# Patient Record
Sex: FEMALE | Race: AMERICAN INDIAN OR ALASKA NATIVE
[De-identification: names, ages, dates, MRNs, and addresses within clinical notes are randomized per-mention and may not be internally consistent; named-entity substitution may affect disease eponyms.]

---

## 2020-10-14 ENCOUNTER — HOSPITAL ENCOUNTER (OUTPATIENT)
Dept: HOSPITAL 5 - TRG | Age: 20
Discharge: HOME | End: 2020-10-14
Attending: OBSTETRICS & GYNECOLOGY
Payer: COMMERCIAL

## 2020-10-14 VITALS — SYSTOLIC BLOOD PRESSURE: 127 MMHG | DIASTOLIC BLOOD PRESSURE: 72 MMHG

## 2020-10-14 DIAGNOSIS — Z3A.28: ICD-10-CM

## 2020-10-14 DIAGNOSIS — O47.03: Primary | ICD-10-CM

## 2020-10-14 PROCEDURE — 59025 FETAL NON-STRESS TEST: CPT

## 2020-11-17 ENCOUNTER — HOSPITAL ENCOUNTER (OUTPATIENT)
Dept: HOSPITAL 5 - TRG | Age: 20
Discharge: HOME | End: 2020-11-17
Attending: OBSTETRICS & GYNECOLOGY
Payer: COMMERCIAL

## 2020-11-17 VITALS — DIASTOLIC BLOOD PRESSURE: 57 MMHG | SYSTOLIC BLOOD PRESSURE: 117 MMHG

## 2020-11-17 DIAGNOSIS — O13.3: Primary | ICD-10-CM

## 2020-11-17 DIAGNOSIS — Z3A.33: ICD-10-CM

## 2020-11-17 LAB
BACTERIA #/AREA URNS HPF: (no result) /HPF
BILIRUB UR QL STRIP: (no result)
BLOOD UR QL VISUAL: (no result)
MUCOUS THREADS #/AREA URNS HPF: (no result) /HPF
PH UR STRIP: 6 [PH] (ref 5–7)
PROT UR STRIP-MCNC: (no result) MG/DL
RBC #/AREA URNS HPF: 2 /HPF (ref 0–6)
UROBILINOGEN UR-MCNC: < 2 MG/DL (ref ?–2)
WBC #/AREA URNS HPF: 4 /HPF (ref 0–6)

## 2020-11-17 PROCEDURE — 81001 URINALYSIS AUTO W/SCOPE: CPT

## 2020-11-17 PROCEDURE — 59025 FETAL NON-STRESS TEST: CPT

## 2020-12-11 ENCOUNTER — HOSPITAL ENCOUNTER (INPATIENT)
Dept: HOSPITAL 5 - LD | Age: 20
LOS: 7 days | Discharge: HOME | End: 2020-12-18
Attending: OBSTETRICS & GYNECOLOGY | Admitting: OBSTETRICS & GYNECOLOGY
Payer: COMMERCIAL

## 2020-12-11 DIAGNOSIS — D62: ICD-10-CM

## 2020-12-11 DIAGNOSIS — O14.00: ICD-10-CM

## 2020-12-11 DIAGNOSIS — Z79.899: ICD-10-CM

## 2020-12-11 DIAGNOSIS — Z82.49: ICD-10-CM

## 2020-12-11 DIAGNOSIS — Z3A.37: ICD-10-CM

## 2020-12-11 DIAGNOSIS — Z20.828: ICD-10-CM

## 2020-12-11 DIAGNOSIS — Z83.3: ICD-10-CM

## 2020-12-11 LAB
ALBUMIN SERPL-MCNC: 3 G/DL (ref 3.9–5)
ALT SERPL-CCNC: 6 UNITS/L (ref 7–56)
BUN SERPL-MCNC: 9 MG/DL (ref 7–17)
BUN/CREAT SERPL: 13 %
CALCIUM SERPL-MCNC: 8.9 MG/DL (ref 8.4–10.2)
HCT VFR BLD CALC: 33 % (ref 30.3–42.9)
HCT VFR BLD CALC: 35.1 % (ref 30.3–42.9)
HEMOLYSIS INDEX: 22
HGB BLD-MCNC: 11.3 GM/DL (ref 10.1–14.3)
HGB BLD-MCNC: 11.8 GM/DL (ref 10.1–14.3)
MCHC RBC AUTO-ENTMCNC: 34 % (ref 30–34)
MCHC RBC AUTO-ENTMCNC: 34 % (ref 30–34)
MCV RBC AUTO: 89 FL (ref 79–97)
MCV RBC AUTO: 89 FL (ref 79–97)
PLATELET # BLD: 372 K/MM3 (ref 140–440)
PLATELET # BLD: 393 K/MM3 (ref 140–440)
RBC # BLD AUTO: 3.72 M/MM3 (ref 3.65–5.03)
RBC # BLD AUTO: 3.96 M/MM3 (ref 3.65–5.03)
URATE SERPL-MCNC: 5.4 MG/DL (ref 3.5–7.6)

## 2020-12-11 PROCEDURE — 86901 BLOOD TYPING SEROLOGIC RH(D): CPT

## 2020-12-11 PROCEDURE — 59200 INSERT CERVICAL DILATOR: CPT

## 2020-12-11 PROCEDURE — 83615 LACTATE (LD) (LDH) ENZYME: CPT

## 2020-12-11 PROCEDURE — 86850 RBC ANTIBODY SCREEN: CPT

## 2020-12-11 PROCEDURE — 36415 COLL VENOUS BLD VENIPUNCTURE: CPT

## 2020-12-11 PROCEDURE — 85014 HEMATOCRIT: CPT

## 2020-12-11 PROCEDURE — 76815 OB US LIMITED FETUS(S): CPT

## 2020-12-11 PROCEDURE — 86900 BLOOD TYPING SEROLOGIC ABO: CPT

## 2020-12-11 PROCEDURE — 80053 COMPREHEN METABOLIC PANEL: CPT

## 2020-12-11 PROCEDURE — 85018 HEMOGLOBIN: CPT

## 2020-12-11 PROCEDURE — 84550 ASSAY OF BLOOD/URIC ACID: CPT

## 2020-12-11 PROCEDURE — 86592 SYPHILIS TEST NON-TREP QUAL: CPT

## 2020-12-11 PROCEDURE — 85027 COMPLETE CBC AUTOMATED: CPT

## 2020-12-11 PROCEDURE — U0003 INFECTIOUS AGENT DETECTION BY NUCLEIC ACID (DNA OR RNA); SEVERE ACUTE RESPIRATORY SYNDROME CORONAVIRUS 2 (SARS-COV-2) (CORONAVIRUS DISEASE [COVID-19]), AMPLIFIED PROBE TECHNIQUE, MAKING USE OF HIGH THROUGHPUT TECHNOLOGIES AS DESCRIBED BY CMS-2020-01-R: HCPCS

## 2020-12-11 NOTE — ULTRASOUND REPORT
ULTRASOUND OBSTETRIC LIMITED 



INDICATION / CLINICAL INFORMATION:

presentation.

Clinical Gestational Age (GA): 37.2 weeks.days



COMPARISON:

None available.



FINDINGS:



FETAL HEART RATE (beats per minute): 146 



PRESENTATION: Cephalic. 



ADDITIONAL FINDINGS: None.



IMPRESSION:

1. Single living intrauterine gestation in cephalic position.



Signer Name: Danilo Galvan MD 

Signed: 12/11/2020 5:20 PM

Workstation Name: [id number]
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
## 2020-12-12 PROCEDURE — 3E0P7VZ INTRODUCTION OF HORMONE INTO FEMALE REPRODUCTIVE, VIA NATURAL OR ARTIFICIAL OPENING: ICD-10-PCS | Performed by: OBSTETRICS & GYNECOLOGY

## 2020-12-12 RX ADMIN — SODIUM CHLORIDE, SODIUM LACTATE, POTASSIUM CHLORIDE, AND CALCIUM CHLORIDE SCH MLS/HR: .6; .31; .03; .02 INJECTION, SOLUTION INTRAVENOUS at 07:41

## 2020-12-13 PROCEDURE — 3E0134Z INTRODUCTION OF SERUM, TOXOID AND VACCINE INTO SUBCUTANEOUS TISSUE, PERCUTANEOUS APPROACH: ICD-10-PCS

## 2020-12-13 PROCEDURE — 3E0234Z INTRODUCTION OF SERUM, TOXOID AND VACCINE INTO MUSCLE, PERCUTANEOUS APPROACH: ICD-10-PCS

## 2020-12-13 RX ADMIN — SODIUM CHLORIDE, SODIUM LACTATE, POTASSIUM CHLORIDE, AND CALCIUM CHLORIDE SCH MLS/HR: .6; .31; .03; .02 INJECTION, SOLUTION INTRAVENOUS at 13:49

## 2020-12-13 RX ADMIN — EPHEDRINE SULFATE PRN MG: 50 INJECTION INTRAVENOUS at 15:45

## 2020-12-13 RX ADMIN — EPHEDRINE SULFATE PRN MG: 50 INJECTION INTRAVENOUS at 15:35

## 2020-12-13 RX ADMIN — FENTANYL CITRATE PRN MCG: 50 INJECTION, SOLUTION INTRAMUSCULAR; INTRAVENOUS at 07:36

## 2020-12-13 RX ADMIN — AMPICILLIN SODIUM SCH MLS/HR: 1 INJECTION, POWDER, FOR SOLUTION INTRAMUSCULAR; INTRAVENOUS at 19:30

## 2020-12-13 RX ADMIN — AMPICILLIN SODIUM SCH MLS/HR: 1 INJECTION, POWDER, FOR SOLUTION INTRAMUSCULAR; INTRAVENOUS at 23:30

## 2020-12-13 RX ADMIN — AMPICILLIN SODIUM SCH MLS/HR: 1 INJECTION, POWDER, FOR SOLUTION INTRAMUSCULAR; INTRAVENOUS at 15:49

## 2020-12-13 RX ADMIN — SODIUM CHLORIDE, SODIUM LACTATE, POTASSIUM CHLORIDE, AND CALCIUM CHLORIDE SCH MLS/HR: .6; .31; .03; .02 INJECTION, SOLUTION INTRAVENOUS at 07:44

## 2020-12-13 RX ADMIN — EPHEDRINE SULFATE PRN MG: 50 INJECTION INTRAVENOUS at 15:47

## 2020-12-13 RX ADMIN — SODIUM CHLORIDE, SODIUM LACTATE, POTASSIUM CHLORIDE, AND CALCIUM CHLORIDE SCH MLS/HR: .6; .31; .03; .02 INJECTION, SOLUTION INTRAVENOUS at 16:49

## 2020-12-13 RX ADMIN — SODIUM CHLORIDE, SODIUM LACTATE, POTASSIUM CHLORIDE, AND CALCIUM CHLORIDE SCH MLS/HR: .6; .31; .03; .02 INJECTION, SOLUTION INTRAVENOUS at 21:23

## 2020-12-13 RX ADMIN — FENTANYL CITRATE PRN MCG: 50 INJECTION, SOLUTION INTRAMUSCULAR; INTRAVENOUS at 11:23

## 2020-12-13 RX ADMIN — FENTANYL CITRATE SCH MLS/HR: 50 INJECTION, SOLUTION INTRAMUSCULAR; INTRAVENOUS at 17:16

## 2020-12-13 NOTE — EVENT NOTE
Date: 12/13/20


Variable FHR decelerations noted. Moderate FHR variability and normal FHR 

baseline. Patient consented to use of IUPC for amnioinfusion. IUPC placed and 

amnioinfusion ordered. Cervix is 3/100/-2. Maternal temp. is 98.0.

## 2020-12-13 NOTE — EVENT NOTE
Date: 12/12/20


Late entry 12/12/2020: Patient is receiving cervical ripening for mild 

preeclampsia at term. Category 1 FHR tracing. Cervidil cervical ripening.

## 2020-12-13 NOTE — PROGRESS NOTE
Assessment and Plan


A: Pregnancy at 37 weeks, 3 days gestation. 


Mild preeclampsia.


GBS unknown.


P: Continue Pitocin for IOL.


Continuous EFM.


GBS prophylaxis.





Subjective





- Subjective


Date of service: 12/13/20


Principal diagnosis: IOL at 37 3/7 weeks gestation; mild Preeclampsia


Interval history: 


Receiving Pitocin for IOL for mild PreE.


SROM at 07:16 with large amount of clear fluid. 


Regular contractions.


Category 1 FHR tracing. 


Stable BPs.


Patient reports: fetal movement normal, contractions





Objective





- Vital Signs


Vital Signs: 


                               Vital Signs - 12hr











  12/12/20 12/12/20 12/13/20





  22:06 23:07 00:00


 


Temperature   98.4 F


 


Pulse Rate 81 74 


 


Respiratory   18





Rate   


 


Blood Pressure 129/63 131/65 


 


Blood Pressure   





[Right]   


 


O2 Sat by Pulse   





Oximetry   














  12/13/20 12/13/20 12/13/20





  03:06 04:06 04:29


 


Temperature   


 


Pulse Rate 75 73 64


 


Respiratory   





Rate   


 


Blood Pressure 113/55 143/81 114/58


 


Blood Pressure   





[Right]   


 


O2 Sat by Pulse   





Oximetry   














  12/13/20 12/13/20 12/13/20





  06:00 06:19 07:37


 


Temperature 98.2 F  


 


Pulse Rate  65 62


 


Respiratory 18  





Rate   


 


Blood Pressure  123/66 139/85


 


Blood Pressure   





[Right]   


 


O2 Sat by Pulse   





Oximetry   














  12/13/20 12/13/20 12/13/20





  07:52 08:11 08:16


 


Temperature 98.1 F  


 


Pulse Rate 62 60 73


 


Respiratory 22  





Rate   


 


Blood Pressure   


 


Blood Pressure 139/85  





[Right]   


 


O2 Sat by Pulse  99 99





Oximetry   














  12/13/20 12/13/20 12/13/20





  08:21 08:24 08:26


 


Temperature   


 


Pulse Rate 71 60 63


 


Respiratory   





Rate   


 


Blood Pressure  141/75 


 


Blood Pressure   





[Right]   


 


O2 Sat by Pulse 98  97





Oximetry   














  12/13/20 12/13/20 12/13/20





  08:31 08:36 08:41


 


Temperature   


 


Pulse Rate 61 65 74


 


Respiratory   





Rate   


 


Blood Pressure   


 


Blood Pressure   





[Right]   


 


O2 Sat by Pulse 100 99 98





Oximetry   














  12/13/20 12/13/20 12/13/20





  08:46 08:51 08:53


 


Temperature   


 


Pulse Rate 64 62 60


 


Respiratory   





Rate   


 


Blood Pressure   140/69


 


Blood Pressure   





[Right]   


 


O2 Sat by Pulse 99 99 





Oximetry   














  12/13/20 12/13/20 12/13/20





  08:56 08:58 09:01


 


Temperature   


 


Pulse Rate 69 69 64


 


Respiratory   





Rate   


 


Blood Pressure   


 


Blood Pressure   





[Right]   


 


O2 Sat by Pulse 99 94 100





Oximetry   














  12/13/20 12/13/20 12/13/20





  09:06 09:11 09:16


 


Temperature   


 


Pulse Rate 70 70 66


 


Respiratory   





Rate   


 


Blood Pressure   


 


Blood Pressure   





[Right]   


 


O2 Sat by Pulse 100 100 100





Oximetry   














  12/13/20 12/13/20 12/13/20





  09:21 09:23 09:26


 


Temperature   


 


Pulse Rate 69 62 69


 


Respiratory   





Rate   


 


Blood Pressure  137/74 


 


Blood Pressure   





[Right]   


 


O2 Sat by Pulse 100  100





Oximetry   














  12/13/20 12/13/20 12/13/20





  09:31 09:36 09:41


 


Temperature   


 


Pulse Rate 67 60 67


 


Respiratory   





Rate   


 


Blood Pressure   


 


Blood Pressure   





[Right]   


 


O2 Sat by Pulse 98 99 100





Oximetry   














  12/13/20 12/13/20 12/13/20





  09:46 09:51 09:53


 


Temperature   


 


Pulse Rate 71 79 63


 


Respiratory   





Rate   


 


Blood Pressure   125/76


 


Blood Pressure   





[Right]   


 


O2 Sat by Pulse 100 100 





Oximetry   














  12/13/20





  09:56


 


Temperature 


 


Pulse Rate 68


 


Respiratory 





Rate 


 


Blood Pressure 


 


Blood Pressure 





[Right] 


 


O2 Sat by Pulse 99





Oximetry 














- Exam


Abdomen: Present: normal appearance, soft.  Absent: distention, tenderness, 

guarding, rigidity


Uterus: Present: normal, fundal height above umbilicus.  Absent: tenderness


FHR: category 1


Uterine Contraction Monitor Mode: External


Cervical Dilatation: 1


Cervical Effacement Percentage: 80 (clear amniotic fluid)


Fetal station: -3





- Labs


Labs: 


                                  Abnormal Labs











  12/11/20 12/11/20





  09:15 19:06


 


WBC  11.4 H 


 


Sodium   135 L


 


Carbon Dioxide   21 L


 


Glucose   119 H


 


ALT   6 L


 


Alkaline Phosphatase   140 H


 


Lactate Dehydrogenase   203 H


 


Total Protein   6.0 L


 


Albumin   3.0 L

## 2020-12-13 NOTE — PROGRESS NOTE
Labor Epidural





- Labor Epidural


Start Time: 14:50


Stop Time: 15:11


Performed by:: MORENO PHILLIP


Procedure: 





Patient is requesting a laboring epidural for laboring pain.  Patient IDed, H&P 

reviewed, all questions and concerns were answered, and consent was signed. 

Timeout was performed at bedside.  Patient in sitting position.  Sterile prep 

and drape was performed.  [3] ml of 1% lidocaine skin wheal at L[3]- L [4].  18-

gauge Tuohy epidural needle was advanced to loss of resistance with air 

technique to   9cm.  Negative CSF negative blood via Tuohy needle.  #27g Spinal 

needle clear, free flowing CSF, Pecedex 5 mcg.  Epidural catheter advanced to 

[12] centimeters.  [negative] Aspiration [negative] test dose.  Sterile dressing

 applied.  Patient tolerated procedure.

## 2020-12-13 NOTE — ANESTHESIA CONSULTATION
Anesthesia Consult and Med Hx


Date of service: 12/13/20





- Airway


Anesthetic Teeth Evaluation: Good


ROM Head & Neck: Adequate


Mental/Hyoid Distance: Adequate


Mallampati Class: Class II


Intubation Access Assessment: Good





- Pulmonary Exam


CTA: Yes





- Cardiac Exam


Cardiac Exam: RRR





- Pre-Operative Health Status


ASA Pre-Surgery Classification: ASA3


Proposed Anesthetic Plan: Epidural





- Pulmonary


Hx Smoking: No


Hx Asthma: No


Hx Respiratory Symptoms: No


SOB: No


COPD: No


Home Oxygen Therapy: No


Hx Pneumonia: No


Hx Sleep Apnea: No





- Cardiovascular System


Hx Hypertension: Yes (this pregnancy)


Hx Coronary Artery Disease: No


Hx Heart Attack/AMI: No


Hx Angina: No


Hx Percutaneous Transluminal Coronary Angioplasty (PTCA): No


Hx Cardia Arrhythmia: No


Hx Pacemaker: No


Hx Internal Defibrillator: No


Hx Valvular Heart Disease: No


Hx Heart Murmur: No


Hx Peripheral Vascular Disease: No





- Central Nervous System


Hx Neuromuscular Disorder: No


Hx Seizures: No


CVA: No


Hx Back Pain: No


Hx Psychiatric Problems: No





- Gastrointestinal


Hx Ulcer: No


Hx Gastroesophageal Reflux Disease: Yes





- Endocrine


Hx Renal Disease: No


Hx End Stage Renal Disease: No


Hx Cirrhosis: No


Hx Liver Disease: No


Hx Insulin Dependent Diabetes: No


Hx Non-Insulin Dependent Diabetes: No


Hx Thyroid Disease: No


Hx Hypothyroidism: No


Hx Hyperthyroidism: No





- Hematic


Hx Anemia: No


Hx Sickle Cell Disease: No





- Other Systems


Hx Alcohol Use: No


Hx Substance Use: No


Hx Cancer: No


Hx Obesity: Yes

## 2020-12-14 PROCEDURE — 3E033VJ INTRODUCTION OF OTHER HORMONE INTO PERIPHERAL VEIN, PERCUTANEOUS APPROACH: ICD-10-PCS | Performed by: OBSTETRICS & GYNECOLOGY

## 2020-12-14 PROCEDURE — 10H07YZ INSERTION OF OTHER DEVICE INTO PRODUCTS OF CONCEPTION, VIA NATURAL OR ARTIFICIAL OPENING: ICD-10-PCS | Performed by: OBSTETRICS & GYNECOLOGY

## 2020-12-14 RX ADMIN — SODIUM CHLORIDE, SODIUM LACTATE, POTASSIUM CHLORIDE, AND CALCIUM CHLORIDE SCH MLS/HR: .6; .31; .03; .02 INJECTION, SOLUTION INTRAVENOUS at 05:05

## 2020-12-14 RX ADMIN — SODIUM CHLORIDE, SODIUM LACTATE, POTASSIUM CHLORIDE, AND CALCIUM CHLORIDE SCH MLS/HR: .6; .31; .03; .02 INJECTION, SOLUTION INTRAVENOUS at 23:26

## 2020-12-14 RX ADMIN — KETOROLAC TROMETHAMINE SCH MG: 30 INJECTION, SOLUTION INTRAMUSCULAR at 18:30

## 2020-12-14 RX ADMIN — FENTANYL CITRATE SCH MLS/HR: 50 INJECTION, SOLUTION INTRAMUSCULAR; INTRAVENOUS at 07:14

## 2020-12-14 RX ADMIN — AMPICILLIN SODIUM SCH MLS/HR: 1 INJECTION, POWDER, FOR SOLUTION INTRAMUSCULAR; INTRAVENOUS at 03:30

## 2020-12-14 RX ADMIN — KETOROLAC TROMETHAMINE SCH MG: 30 INJECTION, SOLUTION INTRAMUSCULAR at 23:26

## 2020-12-14 RX ADMIN — KETOROLAC TROMETHAMINE SCH: 30 INJECTION, SOLUTION INTRAMUSCULAR at 15:00

## 2020-12-14 RX ADMIN — AMPICILLIN SODIUM SCH MLS/HR: 1 INJECTION, POWDER, FOR SOLUTION INTRAMUSCULAR; INTRAVENOUS at 07:14

## 2020-12-14 RX ADMIN — FENTANYL CITRATE SCH MLS/HR: 50 INJECTION, SOLUTION INTRAMUSCULAR; INTRAVENOUS at 00:05

## 2020-12-14 NOTE — PROCEDURE NOTE
OB Delivery Note





- Delivery


Date of Delivery: 20


Surgeon: REANNA BHANDARI JR


Estimated blood loss: other (700cc)





-  Section


Preop diagnosis: arrest of dilation, nonreassuring FHR tracing


Postop diagnosis: same


 section procedure:  section, primary low transverse


Disposition: PACU


Complications: none


Narrative: 


Indication: 20 year old  at 37w4d complicated by mild preeclampsia status 

post failed induction of labor now for primary  for failure to progress

and nonreassuring fetal heart tones.





Findings: Normal uterus, tubes and ovaries.  Clear fluid.  No nuchal cord.





Delivery of male infant at 1253


Apgars 8/9





Urine output 100 cc


 cc


 cc





Procedure: Patient was taken to the operating room prepped and draped in the 

usual sterile fashion.  Pfannenstiel skin incision was made and carried down to 

the underlying fascia.  Fascia was incised and the incision was distended 

bilaterally.  Rectus fascia was dissected off the rectus muscle superiorly and 

inferiorly.  Peritoneum was identified and entered.  Peritoneal incision 

extended superiorly and inferiorly.  The bladder was visualized.  The bladder 

blade was placed.  Uterine hysterotomy incision was made and extended 

bilaterally.  The baby was delivered in the typical vertex fashion.  Baby was 

bulb suction at delivery.  The cord was cut and clamped and handed off to the 

 team.  The placenta was delivered spontaneously.  The uterus was 

exteriorized and cleared of all clots and debris.  Uterine incision was closed 

with a 0 Vicryl in a running locked fashion.  Good hemostasis was noted after 2 

figure-of-eight sutures to the uterine incision.  The urine was noted to be 

clear.  Uterus, tubes, and ovaries were returned to the abdominal cavity.  

Bilateral gutters were cleared and the abdomen and pelvis were irrigated.  Good 

hemostasis noted after Hemoblast was applied to the uterine repair base.  Rectus

muscles were approximately 2-0 Vicryl.  attention was directed towards the 

rectus fascia which was reapproximated with 0 PDS in a running fashion.  The 

subcutaneous tissue was irrigated and reapproximated with 2-0 Vicryl in a 

running fashion.  Skin was closed with a 4-0 Vicryl in a subcuticular fashion.  

The procedure was completed and the patient tolerated the procedure well.  All 

instruments and lap counts were correct x2.








- Infant


  ** A


Apgar at 1 minute: 8


Apgar at 5 minutes: 9


Infant Gender: Male

## 2020-12-14 NOTE — PROGRESS NOTE
Assessment and Plan





Late entry for 9:20 am





0:  with mod suman and late decels


   


A: IUP@ 37.1 wks


    GBS unknown





P: Pitocin was turned off


    IVF bolus was given


    IFM and IUPC inserted


    Pt was repositioned on side with peanut


    Will continue monitoring 





Subjective





- Subjective


Date of service: 12/14/20


Principal diagnosis: IOL at 37 3/7 weeks gestation; mild Preeclampsia


Patient reports: fetal movement normal, contractions





Objective





- Vital Signs


Vital Signs: 


                               Vital Signs - 12hr











  12/13/20 12/13/20 12/13/20





  22:46 22:51 22:56


 


Temperature   


 


Pulse Rate 74 68 75


 


Respiratory   





Rate   


 


Blood Pressure 118/59  


 


Blood Pressure   





[Right]   


 


O2 Sat by Pulse 100 100 100





Oximetry   














  12/13/20 12/13/20 12/13/20





  23:00 23:01 23:06


 


Temperature   


 


Pulse Rate 70 71 83


 


Respiratory   





Rate   


 


Blood Pressure 122/60  


 


Blood Pressure   





[Right]   


 


O2 Sat by Pulse  100 100





Oximetry   














  12/13/20 12/13/20 12/13/20





  23:11 23:15 23:16


 


Temperature   


 


Pulse Rate 79 74 74


 


Respiratory   





Rate   


 


Blood Pressure  116/55 


 


Blood Pressure   





[Right]   


 


O2 Sat by Pulse 100  100





Oximetry   














  12/13/20 12/13/20 12/13/20





  23:21 23:26 23:30


 


Temperature   97.9 F


 


Pulse Rate 70 77 73


 


Respiratory   





Rate   


 


Blood Pressure   106/52


 


Blood Pressure   





[Right]   


 


O2 Sat by Pulse 100 100 





Oximetry   














  12/13/20 12/13/20 12/13/20





  23:31 23:36 23:41


 


Temperature   


 


Pulse Rate 73 75 89


 


Respiratory   





Rate   


 


Blood Pressure   


 


Blood Pressure   





[Right]   


 


O2 Sat by Pulse 100 100 100





Oximetry   














  12/13/20 12/13/20 12/13/20





  23:46 23:51 23:56


 


Temperature   


 


Pulse Rate 69 84 76


 


Respiratory   





Rate   


 


Blood Pressure   


 


Blood Pressure   





[Right]   


 


O2 Sat by Pulse 99 100 100





Oximetry   














  12/14/20 12/14/20 12/14/20





  00:00 00:01 00:06


 


Temperature   


 


Pulse Rate 85 85 82


 


Respiratory   





Rate   


 


Blood Pressure 135/67  


 


Blood Pressure   





[Right]   


 


O2 Sat by Pulse  99 100





Oximetry   














  12/14/20 12/14/20 12/14/20





  00:11 00:15 00:16


 


Temperature   


 


Pulse Rate 87 81 88


 


Respiratory   





Rate   


 


Blood Pressure  139/60 


 


Blood Pressure   





[Right]   


 


O2 Sat by Pulse 100  100





Oximetry   














  12/14/20 12/14/20 12/14/20





  00:21 00:26 00:30


 


Temperature   98.8 F


 


Pulse Rate 77 69 73


 


Respiratory   18





Rate   


 


Blood Pressure   


 


Blood Pressure   130/95





[Right]   


 


O2 Sat by Pulse 100 100 100





Oximetry   














  12/14/20 12/14/20 12/14/20





  00:31 00:36 00:41


 


Temperature   


 


Pulse Rate 74 76 87


 


Respiratory   





Rate   


 


Blood Pressure 135/80  


 


Blood Pressure   





[Right]   


 


O2 Sat by Pulse 100 100 100





Oximetry   














  12/14/20 12/14/20 12/14/20





  00:46 00:51 00:56


 


Temperature   


 


Pulse Rate 90 73 75


 


Respiratory   





Rate   


 


Blood Pressure 130/95  


 


Blood Pressure   





[Right]   


 


O2 Sat by Pulse 100 100 100





Oximetry   














  12/14/20 12/14/20 12/14/20





  01:01 01:02 01:06


 


Temperature   


 


Pulse Rate 74 80 78


 


Respiratory   





Rate   


 


Blood Pressure  127/71 


 


Blood Pressure   





[Right]   


 


O2 Sat by Pulse 100  100





Oximetry   














  12/14/20 12/14/20 12/14/20





  01:11 01:16 01:21


 


Temperature   


 


Pulse Rate 77 76 77


 


Respiratory   





Rate   


 


Blood Pressure   


 


Blood Pressure   





[Right]   


 


O2 Sat by Pulse 100 100 100





Oximetry   














  12/14/20 12/14/20 12/14/20





  01:26 01:31 01:36


 


Temperature   


 


Pulse Rate 77 93 H 79


 


Respiratory   





Rate   


 


Blood Pressure  115/68 


 


Blood Pressure   





[Right]   


 


O2 Sat by Pulse 100 99 99





Oximetry   














  12/14/20 12/14/20 12/14/20





  01:41 01:45 01:46


 


Temperature   


 


Pulse Rate 79 82 81


 


Respiratory   





Rate   


 


Blood Pressure  114/56 


 


Blood Pressure   





[Right]   


 


O2 Sat by Pulse 98  97





Oximetry   














  12/14/20 12/14/20 12/14/20





  01:51 01:56 02:00


 


Temperature   


 


Pulse Rate 88 83 81


 


Respiratory   





Rate   


 


Blood Pressure   109/56


 


Blood Pressure   





[Right]   


 


O2 Sat by Pulse 96 96 





Oximetry   














  12/14/20 12/14/20 12/14/20





  02:01 02:06 02:11


 


Temperature   


 


Pulse Rate 89 89 86


 


Respiratory   





Rate   


 


Blood Pressure   


 


Blood Pressure   





[Right]   


 


O2 Sat by Pulse 94 96 93





Oximetry   














  12/14/20 12/14/20 12/14/20





  02:15 02:16 02:21


 


Temperature   


 


Pulse Rate 83 79 87


 


Respiratory   





Rate   


 


Blood Pressure 116/58  


 


Blood Pressure   





[Right]   


 


O2 Sat by Pulse  96 96





Oximetry   














  12/14/20 12/14/20 12/14/20





  02:26 02:30 02:31


 


Temperature  98.6 F 


 


Pulse Rate 81 81 77


 


Respiratory   





Rate   


 


Blood Pressure  111/52 


 


Blood Pressure   





[Right]   


 


O2 Sat by Pulse 100  100





Oximetry   














  12/14/20 12/14/20 12/14/20





  02:36 02:41 02:45


 


Temperature   


 


Pulse Rate 74 74 67


 


Respiratory   





Rate   


 


Blood Pressure   115/58


 


Blood Pressure   





[Right]   


 


O2 Sat by Pulse 100 100 





Oximetry   














  12/14/20 12/14/20 12/14/20





  02:46 02:51 02:56


 


Temperature   


 


Pulse Rate 71 75 73


 


Respiratory   





Rate   


 


Blood Pressure   


 


Blood Pressure   





[Right]   


 


O2 Sat by Pulse 100 100 100





Oximetry   














  12/14/20 12/14/20 12/14/20





  03:01 03:06 03:11


 


Temperature   


 


Pulse Rate 70 72 72


 


Respiratory   





Rate   


 


Blood Pressure 116/59  


 


Blood Pressure   





[Right]   


 


O2 Sat by Pulse 100 100 100





Oximetry   














  12/14/20 12/14/20 12/14/20





  03:15 03:16 03:21


 


Temperature   


 


Pulse Rate 83 79 72


 


Respiratory   





Rate   


 


Blood Pressure 123/58  


 


Blood Pressure   





[Right]   


 


O2 Sat by Pulse  100 100





Oximetry   














  12/14/20 12/14/20 12/14/20





  03:26 03:31 03:36


 


Temperature   


 


Pulse Rate 72 75 75


 


Respiratory   





Rate   


 


Blood Pressure  115/54 


 


Blood Pressure   





[Right]   


 


O2 Sat by Pulse 100 99 100





Oximetry   














  12/14/20 12/14/20 12/14/20





  03:41 03:45 03:46


 


Temperature   


 


Pulse Rate 77 73 73


 


Respiratory   





Rate   


 


Blood Pressure  126/58 


 


Blood Pressure   





[Right]   


 


O2 Sat by Pulse 100  99





Oximetry   














  12/14/20 12/14/20 12/14/20





  03:51 03:56 04:00


 


Temperature   


 


Pulse Rate 72 78 80


 


Respiratory   





Rate   


 


Blood Pressure   118/59


 


Blood Pressure   





[Right]   


 


O2 Sat by Pulse 100 100 





Oximetry   














  12/14/20 12/14/20 12/14/20





  04:01 04:06 04:11


 


Temperature   


 


Pulse Rate 73 70 71


 


Respiratory   





Rate   


 


Blood Pressure   


 


Blood Pressure   





[Right]   


 


O2 Sat by Pulse 100 100 100





Oximetry   














  12/14/20 12/14/20 12/14/20





  04:15 04:16 04:21


 


Temperature   


 


Pulse Rate 69 76 79


 


Respiratory   





Rate   


 


Blood Pressure 123/57  


 


Blood Pressure   





[Right]   


 


O2 Sat by Pulse  100 100





Oximetry   














  12/14/20 12/14/20 12/14/20





  04:26 04:30 04:31


 


Temperature  98.2 F 


 


Pulse Rate 71 71 78


 


Respiratory  16 





Rate   


 


Blood Pressure  118/60 


 


Blood Pressure  118/60 





[Right]   


 


O2 Sat by Pulse 100 100 100





Oximetry   














  12/14/20 12/14/20 12/14/20





  04:36 04:41 04:46


 


Temperature   


 


Pulse Rate 74 90 90


 


Respiratory   





Rate   


 


Blood Pressure   


 


Blood Pressure   





[Right]   


 


O2 Sat by Pulse 100 100 93





Oximetry   














  12/14/20 12/14/20 12/14/20





  04:51 04:56 05:01


 


Temperature   


 


Pulse Rate 73 73 68


 


Respiratory   





Rate   


 


Blood Pressure   125/60


 


Blood Pressure   





[Right]   


 


O2 Sat by Pulse 100 100 100





Oximetry   














  12/14/20 12/14/20 12/14/20





  05:06 05:11 05:15


 


Temperature   


 


Pulse Rate 87 78 71


 


Respiratory   





Rate   


 


Blood Pressure   119/55


 


Blood Pressure   





[Right]   


 


O2 Sat by Pulse 100 100 





Oximetry   














  12/14/20 12/14/20 12/14/20





  05:16 05:21 05:26


 


Temperature   


 


Pulse Rate 68 72 70


 


Respiratory   





Rate   


 


Blood Pressure   


 


Blood Pressure   





[Right]   


 


O2 Sat by Pulse 100 100 100





Oximetry   














  12/14/20 12/14/20 12/14/20





  05:29 05:31 05:36


 


Temperature   


 


Pulse Rate 75 68 70


 


Respiratory   





Rate   


 


Blood Pressure 123/58  


 


Blood Pressure   





[Right]   


 


O2 Sat by Pulse  100 100





Oximetry   














  12/14/20 12/14/20 12/14/20





  05:41 05:46 05:47


 


Temperature   


 


Pulse Rate 70 71 65


 


Respiratory   





Rate   


 


Blood Pressure   129/58


 


Blood Pressure   





[Right]   


 


O2 Sat by Pulse 99 100 





Oximetry   














  12/14/20 12/14/20 12/14/20





  05:51 05:56 06:00


 


Temperature   


 


Pulse Rate 74 72 70


 


Respiratory   





Rate   


 


Blood Pressure   125/59


 


Blood Pressure   





[Right]   


 


O2 Sat by Pulse 100 100 





Oximetry   














  12/14/20 12/14/20 12/14/20





  06:01 06:06 06:11


 


Temperature   


 


Pulse Rate 75 71 72


 


Respiratory   





Rate   


 


Blood Pressure   


 


Blood Pressure   





[Right]   


 


O2 Sat by Pulse 100 100 100





Oximetry   














  12/14/20 12/14/20 12/14/20





  06:15 06:16 06:21


 


Temperature   


 


Pulse Rate 75 78 74


 


Respiratory   





Rate   


 


Blood Pressure 131/62  


 


Blood Pressure   





[Right]   


 


O2 Sat by Pulse  100 99





Oximetry   














  12/14/20 12/14/20 12/14/20





  06:26 06:30 06:31


 


Temperature  98.6 F 


 


Pulse Rate 75  76


 


Respiratory   





Rate   


 


Blood Pressure   126/65


 


Blood Pressure   





[Right]   


 


O2 Sat by Pulse 100  95





Oximetry   














  12/14/20 12/14/20 12/14/20





  06:36 06:41 06:46


 


Temperature   


 


Pulse Rate 76 76 75


 


Respiratory   





Rate   


 


Blood Pressure   129/61


 


Blood Pressure   





[Right]   


 


O2 Sat by Pulse 94 93 95





Oximetry   














  12/14/20 12/14/20 12/14/20





  06:51 06:56 07:00


 


Temperature   


 


Pulse Rate 83 83 76


 


Respiratory   





Rate   


 


Blood Pressure   118/58


 


Blood Pressure   





[Right]   


 


O2 Sat by Pulse 97 94 





Oximetry   














  12/14/20 12/14/20 12/14/20





  07:01 07:06 07:11


 


Temperature   


 


Pulse Rate 89 97 H 92 H


 


Respiratory   





Rate   


 


Blood Pressure   


 


Blood Pressure   





[Right]   


 


O2 Sat by Pulse 96 98 98





Oximetry   














  12/14/20 12/14/20 12/14/20





  07:16 07:21 07:26


 


Temperature   


 


Pulse Rate 87 73 76


 


Respiratory   





Rate   


 


Blood Pressure 121/56  


 


Blood Pressure   





[Right]   


 


O2 Sat by Pulse 94 98 99





Oximetry   














  12/14/20 12/14/20 12/14/20





  07:31 07:36 07:41


 


Temperature   


 


Pulse Rate 81 80 79


 


Respiratory   





Rate   


 


Blood Pressure 120/60  


 


Blood Pressure   





[Right]   


 


O2 Sat by Pulse 95 97 97





Oximetry   














  12/14/20 12/14/20 12/14/20





  07:45 07:46 07:51


 


Temperature   


 


Pulse Rate 82 86 76


 


Respiratory   





Rate   


 


Blood Pressure 119/67  


 


Blood Pressure   





[Right]   


 


O2 Sat by Pulse  98 97





Oximetry   














  12/14/20 12/14/20 12/14/20





  07:56 08:00 08:01


 


Temperature  98.0 F 


 


Pulse Rate 80 70 82


 


Respiratory   





Rate   


 


Blood Pressure  118/58 


 


Blood Pressure   





[Right]   


 


O2 Sat by Pulse 98  93





Oximetry   














  12/14/20 12/14/20 12/14/20





  08:06 08:11 08:15


 


Temperature   


 


Pulse Rate 78 77 82


 


Respiratory   





Rate   


 


Blood Pressure   119/63


 


Blood Pressure   





[Right]   


 


O2 Sat by Pulse 94 95 





Oximetry   














  12/14/20 12/14/20 12/14/20





  08:16 08:21 08:26


 


Temperature   


 


Pulse Rate 75 90 86


 


Respiratory   





Rate   


 


Blood Pressure   


 


Blood Pressure   





[Right]   


 


O2 Sat by Pulse 98 97 99





Oximetry   














  12/14/20 12/14/20 12/14/20





  08:30 08:31 08:36


 


Temperature   


 


Pulse Rate 80 86 80


 


Respiratory   





Rate   


 


Blood Pressure 118/59  


 


Blood Pressure   





[Right]   


 


O2 Sat by Pulse  98 98





Oximetry   














  12/14/20 12/14/20 12/14/20





  08:41 08:45 08:46


 


Temperature   


 


Pulse Rate 75 83 79


 


Respiratory   





Rate   


 


Blood Pressure  129/66 


 


Blood Pressure   





[Right]   


 


O2 Sat by Pulse 97  99





Oximetry   














  12/14/20 12/14/20 12/14/20





  08:51 08:56 09:01


 


Temperature   


 


Pulse Rate 76 74 74


 


Respiratory   





Rate   


 


Blood Pressure   127/62


 


Blood Pressure   





[Right]   


 


O2 Sat by Pulse 99 96 95





Oximetry   














  12/14/20 12/14/20 12/14/20





  09:06 09:11 09:16


 


Temperature   


 


Pulse Rate 75 76 75


 


Respiratory   





Rate   


 


Blood Pressure   139/70


 


Blood Pressure   





[Right]   


 


O2 Sat by Pulse 96 97 97





Oximetry   














  12/14/20 12/14/20 12/14/20





  09:21 09:26 09:28


 


Temperature   


 


Pulse Rate 80 86 85


 


Respiratory   





Rate   


 


Blood Pressure   


 


Blood Pressure   





[Right]   


 


O2 Sat by Pulse 91 92 65 L





Oximetry   














  12/14/20 12/14/20 12/14/20





  09:31 09:32 09:37


 


Temperature   


 


Pulse Rate 75 72 73


 


Respiratory   





Rate   


 


Blood Pressure 124/58  


 


Blood Pressure   





[Right]   


 


O2 Sat by Pulse  98 99





Oximetry   














  12/14/20 12/14/20 12/14/20





  09:42 09:45 09:46


 


Temperature   


 


Pulse Rate 77  77


 


Respiratory   





Rate   


 


Blood Pressure   132/68


 


Blood Pressure   





[Right]   


 


O2 Sat by Pulse 99 89 





Oximetry   














  12/14/20 12/14/20 12/14/20





  09:47 09:51 09:52


 


Temperature   


 


Pulse Rate 90 87 99 H


 


Respiratory   





Rate   


 


Blood Pressure   


 


Blood Pressure   





[Right]   


 


O2 Sat by Pulse 100 84 100





Oximetry   














  12/14/20 12/14/20 12/14/20





  09:57 10:00 10:02


 


Temperature   


 


Pulse Rate 88 81 84


 


Respiratory   





Rate   


 


Blood Pressure  135/70 


 


Blood Pressure   





[Right]   


 


O2 Sat by Pulse 100  99





Oximetry   














  12/14/20 12/14/20 12/14/20





  10:07 10:12 10:15


 


Temperature   


 


Pulse Rate 72 71 70


 


Respiratory   





Rate   


 


Blood Pressure   119/61


 


Blood Pressure   





[Right]   


 


O2 Sat by Pulse 97 93 





Oximetry   














  12/14/20 12/14/20 12/14/20





  10:17 10:22 10:27


 


Temperature   


 


Pulse Rate 76 75 76


 


Respiratory   





Rate   


 


Blood Pressure   


 


Blood Pressure   





[Right]   


 


O2 Sat by Pulse 95 95 97





Oximetry   














  12/14/20 12/14/20 12/14/20





  10:30 10:32 10:37


 


Temperature   


 


Pulse Rate 78 77 79


 


Respiratory   





Rate   


 


Blood Pressure 128/65  


 


Blood Pressure   





[Right]   


 


O2 Sat by Pulse  98 98





Oximetry   














  12/14/20 12/14/20





  10:41 10:42


 


Temperature  


 


Pulse Rate  72


 


Respiratory  





Rate  


 


Blood Pressure  


 


Blood Pressure  





[Right]  


 


O2 Sat by Pulse 79 L 99





Oximetry  














- Exam


Breasts: normal


Abdomen: Present: soft, normal bowel sounds, other (gravid)


Vulva: both: normal


Uterus: Present: firm, other (gravid)


FHR: category 3


Uterine Contraction Monitor Mode: Internal


Cervical Dilatation: 4


Cervical Effacement Percentage: 80


Fetal station: -3


Uterine Contraction Frequency (min): irreg


Uterine Contraction Pattern: Irregular


Uterine Tone Measurement Phase: Resting


Uterine Contraction Intensity: Moderate


Extremities: normal





- Labs


Labs: 


                                  Abnormal Labs











  12/11/20 12/11/20





  09:15 19:06


 


WBC  11.4 H 


 


Sodium   135 L


 


Carbon Dioxide   21 L


 


Glucose   119 H


 


ALT   6 L


 


Alkaline Phosphatase   140 H


 


Lactate Dehydrogenase   203 H


 


Total Protein   6.0 L


 


Albumin   3.0 L








                         Laboratory Results - last 24 hr











  12/11/20





  Unknown


 


Coronavirus (PCR)  Negative

## 2020-12-14 NOTE — EVENT NOTE
Date: 12/14/20


SVE 4.5/100/-1 to -2, OP position. Category 2 FHR tracing noted: variable, 

early, and occasional late decelerations with moderate variability and normal 

FHR baseline. Pitocin stopped (was at 2 mu/min). Patient positioned in left 

lateral position. Amnioinfusion continues. O2 per face mask. Maternal 

temperature 98.8. Consulted with Dr. Meza re: patient, slow cervical change, 

prolonged ROM, FHR tracing and decels, interventions taken. Dr. Meza orders to 

resume Pitocin when able at low dose and continue to attempt vaginal birth; no 

other orders received. Discussed Dr. Meza's recommendation with patient and she

states she is in agreement with POC.

## 2020-12-14 NOTE — EVENT NOTE
Date: 12/14/20





A:  with minimal suman and repetitive late decels


    4th day of IOL





P: Dr Kc called and notified of pt's status


    02 at 10L via FM, Amnioinf infusing


    Continue other resuscitative measures

## 2020-12-14 NOTE — EVENT NOTE
Date: 20


Patient reports pelvic pressure. SVE: cervix unchanged. Notified Dr. Meza re: 

lack of cervical change and prolonged ROM. Requested  delivery. Dr. Meza states he will pass on to oncoming MD. Oncoming midwife and patient's 

nurse also notified. FHR tracing remains reassuring and amnioinfusion continues.

## 2020-12-14 NOTE — PROGRESS NOTE
Subjective


Date of service: 12/14/20


Principal diagnosis: UG Bilateral TAP Block for Postop pain 


Interval history: 





Patient consented for TAP block for post surgical pain management.  Patient jorge

ntified, monitors placed, and time out performed.  TAP identified bilaterally 

via ultrasound.  Skin prepped bilaterally with [chlorhexidine] and [22g 

stimuplex] needle advanced to the TAP.  [Marcaine 0.33% 35ml] injected under 

ultrasound guidance on the [left] side.  [Marcaine 0.33% 35ml] injected under 

ultrasound guidance on the [right] side. Negative Aspiration every 5mL. No 

change in heart rate or rhythm. Tolerated procedure well. 








Objective





- Constitutional


Vitals: 


                               Vital Signs - 12hr











  12/14/20 12/14/20 12/14/20





  02:11 02:15 02:16


 


Temperature   


 


Pulse Rate 86 83 79


 


Respiratory   





Rate   


 


Blood Pressure  116/58 


 


Blood Pressure   





[Right]   


 


O2 Sat by Pulse 93  96





Oximetry   














  12/14/20 12/14/20 12/14/20





  02:21 02:26 02:30


 


Temperature   98.6 F


 


Pulse Rate 87 81 81


 


Respiratory   





Rate   


 


Blood Pressure   111/52


 


Blood Pressure   





[Right]   


 


O2 Sat by Pulse 96 100 





Oximetry   














  12/14/20 12/14/20 12/14/20





  02:31 02:36 02:41


 


Temperature   


 


Pulse Rate 77 74 74


 


Respiratory   





Rate   


 


Blood Pressure   


 


Blood Pressure   





[Right]   


 


O2 Sat by Pulse 100 100 100





Oximetry   














  12/14/20 12/14/20 12/14/20





  02:45 02:46 02:51


 


Temperature   


 


Pulse Rate 67 71 75


 


Respiratory   





Rate   


 


Blood Pressure 115/58  


 


Blood Pressure   





[Right]   


 


O2 Sat by Pulse  100 100





Oximetry   














  12/14/20 12/14/20 12/14/20





  02:56 03:01 03:06


 


Temperature   


 


Pulse Rate 73 70 72


 


Respiratory   





Rate   


 


Blood Pressure  116/59 


 


Blood Pressure   





[Right]   


 


O2 Sat by Pulse 100 100 100





Oximetry   














  12/14/20 12/14/20 12/14/20





  03:11 03:15 03:16


 


Temperature   


 


Pulse Rate 72 83 79


 


Respiratory   





Rate   


 


Blood Pressure  123/58 


 


Blood Pressure   





[Right]   


 


O2 Sat by Pulse 100  100





Oximetry   














  12/14/20 12/14/20 12/14/20





  03:21 03:26 03:31


 


Temperature   


 


Pulse Rate 72 72 75


 


Respiratory   





Rate   


 


Blood Pressure   115/54


 


Blood Pressure   





[Right]   


 


O2 Sat by Pulse 100 100 99





Oximetry   














  12/14/20 12/14/20 12/14/20





  03:36 03:41 03:45


 


Temperature   


 


Pulse Rate 75 77 73


 


Respiratory   





Rate   


 


Blood Pressure   126/58


 


Blood Pressure   





[Right]   


 


O2 Sat by Pulse 100 100 





Oximetry   














  12/14/20 12/14/20 12/14/20





  03:46 03:51 03:56


 


Temperature   


 


Pulse Rate 73 72 78


 


Respiratory   





Rate   


 


Blood Pressure   


 


Blood Pressure   





[Right]   


 


O2 Sat by Pulse 99 100 100





Oximetry   














  12/14/20 12/14/20 12/14/20





  04:00 04:01 04:06


 


Temperature   


 


Pulse Rate 80 73 70


 


Respiratory   





Rate   


 


Blood Pressure 118/59  


 


Blood Pressure   





[Right]   


 


O2 Sat by Pulse  100 100





Oximetry   














  12/14/20 12/14/20 12/14/20





  04:11 04:15 04:16


 


Temperature   


 


Pulse Rate 71 69 76


 


Respiratory   





Rate   


 


Blood Pressure  123/57 


 


Blood Pressure   





[Right]   


 


O2 Sat by Pulse 100  100





Oximetry   














  12/14/20 12/14/20 12/14/20





  04:21 04:26 04:30


 


Temperature   98.2 F


 


Pulse Rate 79 71 71


 


Respiratory   16





Rate   


 


Blood Pressure   118/60


 


Blood Pressure   118/60





[Right]   


 


O2 Sat by Pulse 100 100 100





Oximetry   














  12/14/20 12/14/20 12/14/20





  04:31 04:36 04:41


 


Temperature   


 


Pulse Rate 78 74 90


 


Respiratory   





Rate   


 


Blood Pressure   


 


Blood Pressure   





[Right]   


 


O2 Sat by Pulse 100 100 100





Oximetry   














  12/14/20 12/14/20 12/14/20





  04:46 04:51 04:56


 


Temperature   


 


Pulse Rate 90 73 73


 


Respiratory   





Rate   


 


Blood Pressure   


 


Blood Pressure   





[Right]   


 


O2 Sat by Pulse 93 100 100





Oximetry   














  12/14/20 12/14/20 12/14/20





  05:01 05:06 05:11


 


Temperature   


 


Pulse Rate 68 87 78


 


Respiratory   





Rate   


 


Blood Pressure 125/60  


 


Blood Pressure   





[Right]   


 


O2 Sat by Pulse 100 100 100





Oximetry   














  12/14/20 12/14/20 12/14/20





  05:15 05:16 05:21


 


Temperature   


 


Pulse Rate 71 68 72


 


Respiratory   





Rate   


 


Blood Pressure 119/55  


 


Blood Pressure   





[Right]   


 


O2 Sat by Pulse  100 100





Oximetry   














  12/14/20 12/14/20 12/14/20





  05:26 05:29 05:31


 


Temperature   


 


Pulse Rate 70 75 68


 


Respiratory   





Rate   


 


Blood Pressure  123/58 


 


Blood Pressure   





[Right]   


 


O2 Sat by Pulse 100  100





Oximetry   














  12/14/20 12/14/20 12/14/20





  05:36 05:41 05:46


 


Temperature   


 


Pulse Rate 70 70 71


 


Respiratory   





Rate   


 


Blood Pressure   


 


Blood Pressure   





[Right]   


 


O2 Sat by Pulse 100 99 100





Oximetry   














  12/14/20 12/14/20 12/14/20





  05:47 05:51 05:56


 


Temperature   


 


Pulse Rate 65 74 72


 


Respiratory   





Rate   


 


Blood Pressure 129/58  


 


Blood Pressure   





[Right]   


 


O2 Sat by Pulse  100 100





Oximetry   














  12/14/20 12/14/20 12/14/20





  06:00 06:01 06:06


 


Temperature   


 


Pulse Rate 70 75 71


 


Respiratory   





Rate   


 


Blood Pressure 125/59  


 


Blood Pressure   





[Right]   


 


O2 Sat by Pulse  100 100





Oximetry   














  12/14/20 12/14/20 12/14/20





  06:11 06:15 06:16


 


Temperature   


 


Pulse Rate 72 75 78


 


Respiratory   





Rate   


 


Blood Pressure  131/62 


 


Blood Pressure   





[Right]   


 


O2 Sat by Pulse 100  100





Oximetry   














  12/14/20 12/14/20 12/14/20





  06:21 06:26 06:30


 


Temperature   98.6 F


 


Pulse Rate 74 75 


 


Respiratory   





Rate   


 


Blood Pressure   


 


Blood Pressure   





[Right]   


 


O2 Sat by Pulse 99 100 





Oximetry   














  12/14/20 12/14/20 12/14/20





  06:31 06:36 06:41


 


Temperature   


 


Pulse Rate 76 76 76


 


Respiratory   





Rate   


 


Blood Pressure 126/65  


 


Blood Pressure   





[Right]   


 


O2 Sat by Pulse 95 94 93





Oximetry   














  12/14/20 12/14/20 12/14/20





  06:46 06:51 06:56


 


Temperature   


 


Pulse Rate 75 83 83


 


Respiratory   





Rate   


 


Blood Pressure 129/61  


 


Blood Pressure   





[Right]   


 


O2 Sat by Pulse 95 97 94





Oximetry   














  12/14/20 12/14/20 12/14/20





  07:00 07:01 07:06


 


Temperature   


 


Pulse Rate 76 89 97 H


 


Respiratory   





Rate   


 


Blood Pressure 118/58  


 


Blood Pressure   





[Right]   


 


O2 Sat by Pulse  96 98





Oximetry   














  12/14/20 12/14/20 12/14/20





  07:11 07:16 07:21


 


Temperature   


 


Pulse Rate 92 H 87 73


 


Respiratory   





Rate   


 


Blood Pressure  121/56 


 


Blood Pressure   





[Right]   


 


O2 Sat by Pulse 98 94 98





Oximetry   














  12/14/20 12/14/20 12/14/20





  07:26 07:31 07:36


 


Temperature   


 


Pulse Rate 76 81 80


 


Respiratory   





Rate   


 


Blood Pressure  120/60 


 


Blood Pressure   





[Right]   


 


O2 Sat by Pulse 99 95 97





Oximetry   














  12/14/20 12/14/20 12/14/20





  07:41 07:45 07:46


 


Temperature   


 


Pulse Rate 79 82 86


 


Respiratory   





Rate   


 


Blood Pressure  119/67 


 


Blood Pressure   





[Right]   


 


O2 Sat by Pulse 97  98





Oximetry   














  12/14/20 12/14/20 12/14/20





  07:51 07:56 08:00


 


Temperature   98.0 F


 


Pulse Rate 76 80 70


 


Respiratory   





Rate   


 


Blood Pressure   118/58


 


Blood Pressure   





[Right]   


 


O2 Sat by Pulse 97 98 





Oximetry   














  12/14/20 12/14/20 12/14/20





  08:01 08:06 08:11


 


Temperature   


 


Pulse Rate 82 78 77


 


Respiratory   





Rate   


 


Blood Pressure   


 


Blood Pressure   





[Right]   


 


O2 Sat by Pulse 93 94 95





Oximetry   














  12/14/20 12/14/20 12/14/20





  08:15 08:16 08:21


 


Temperature   


 


Pulse Rate 82 75 90


 


Respiratory   





Rate   


 


Blood Pressure 119/63  


 


Blood Pressure   





[Right]   


 


O2 Sat by Pulse  98 97





Oximetry   














  12/14/20 12/14/20 12/14/20





  08:26 08:30 08:31


 


Temperature   


 


Pulse Rate 86 80 86


 


Respiratory   





Rate   


 


Blood Pressure  118/59 


 


Blood Pressure   





[Right]   


 


O2 Sat by Pulse 99  98





Oximetry   














  12/14/20 12/14/20 12/14/20





  08:36 08:41 08:45


 


Temperature   


 


Pulse Rate 80 75 83


 


Respiratory   





Rate   


 


Blood Pressure   129/66


 


Blood Pressure   





[Right]   


 


O2 Sat by Pulse 98 97 





Oximetry   














  12/14/20 12/14/20 12/14/20





  08:46 08:51 08:56


 


Temperature   


 


Pulse Rate 79 76 74


 


Respiratory   





Rate   


 


Blood Pressure   


 


Blood Pressure   





[Right]   


 


O2 Sat by Pulse 99 99 96





Oximetry   














  12/14/20 12/14/20 12/14/20





  09:01 09:06 09:11


 


Temperature   


 


Pulse Rate 74 75 76


 


Respiratory   





Rate   


 


Blood Pressure 127/62  


 


Blood Pressure   





[Right]   


 


O2 Sat by Pulse 95 96 97





Oximetry   














  12/14/20 12/14/20 12/14/20





  09:16 09:21 09:26


 


Temperature   


 


Pulse Rate 75 80 86


 


Respiratory   





Rate   


 


Blood Pressure 139/70  


 


Blood Pressure   





[Right]   


 


O2 Sat by Pulse 97 91 92





Oximetry   














  12/14/20 12/14/20 12/14/20





  09:28 09:31 09:32


 


Temperature   


 


Pulse Rate 85 75 72


 


Respiratory   





Rate   


 


Blood Pressure  124/58 


 


Blood Pressure   





[Right]   


 


O2 Sat by Pulse 65 L  98





Oximetry   














  12/14/20 12/14/20 12/14/20





  09:37 09:42 09:45


 


Temperature   


 


Pulse Rate 73 77 


 


Respiratory   





Rate   


 


Blood Pressure   


 


Blood Pressure   





[Right]   


 


O2 Sat by Pulse 99 99 89





Oximetry   














  12/14/20 12/14/20 12/14/20





  09:46 09:47 09:51


 


Temperature   


 


Pulse Rate 77 90 87


 


Respiratory   





Rate   


 


Blood Pressure 132/68  


 


Blood Pressure   





[Right]   


 


O2 Sat by Pulse  100 84





Oximetry   














  12/14/20 12/14/20 12/14/20





  09:52 09:57 10:00


 


Temperature   


 


Pulse Rate 99 H 88 81


 


Respiratory   





Rate   


 


Blood Pressure   135/70


 


Blood Pressure   





[Right]   


 


O2 Sat by Pulse 100 100 





Oximetry   














  12/14/20 12/14/20 12/14/20





  10:02 10:07 10:12


 


Temperature   


 


Pulse Rate 84 72 71


 


Respiratory   





Rate   


 


Blood Pressure   


 


Blood Pressure   





[Right]   


 


O2 Sat by Pulse 99 97 93





Oximetry   














  12/14/20 12/14/20 12/14/20





  10:15 10:17 10:22


 


Temperature   


 


Pulse Rate 70 76 75


 


Respiratory   





Rate   


 


Blood Pressure 119/61  


 


Blood Pressure   





[Right]   


 


O2 Sat by Pulse  95 95





Oximetry   














  12/14/20 12/14/20 12/14/20





  10:27 10:30 10:32


 


Temperature   


 


Pulse Rate 76 78 77


 


Respiratory   





Rate   


 


Blood Pressure  128/65 


 


Blood Pressure   





[Right]   


 


O2 Sat by Pulse 97  98





Oximetry   














  12/14/20 12/14/20 12/14/20





  10:37 10:41 10:42


 


Temperature   


 


Pulse Rate 79  72


 


Respiratory   





Rate   


 


Blood Pressure   


 


Blood Pressure   





[Right]   


 


O2 Sat by Pulse 98 79 L 99





Oximetry   














  12/14/20 12/14/20 12/14/20





  10:45 10:47 10:52


 


Temperature   


 


Pulse Rate 74 76 77


 


Respiratory   





Rate   


 


Blood Pressure 119/59  


 


Blood Pressure   





[Right]   


 


O2 Sat by Pulse  96 96





Oximetry   














  12/14/20 12/14/20 12/14/20





  10:57 11:00 11:01


 


Temperature   


 


Pulse Rate 74 77 78


 


Respiratory   





Rate   


 


Blood Pressure  128/67 


 


Blood Pressure   





[Right]   


 


O2 Sat by Pulse 96  74 L





Oximetry   














  12/14/20 12/14/20 12/14/20





  11:04 11:09 11:14


 


Temperature   


 


Pulse Rate 83 78 77


 


Respiratory   





Rate   


 


Blood Pressure   


 


Blood Pressure   





[Right]   


 


O2 Sat by Pulse 95 99 98





Oximetry   














  12/14/20 12/14/20 12/14/20





  11:15 11:19 11:24


 


Temperature   


 


Pulse Rate 83 90 88


 


Respiratory   





Rate   


 


Blood Pressure 124/62  


 


Blood Pressure   





[Right]   


 


O2 Sat by Pulse  99 98





Oximetry   














  12/14/20 12/14/20 12/14/20





  11:25 11:29 11:30


 


Temperature   


 


Pulse Rate 103 H 83 83


 


Respiratory   





Rate   


 


Blood Pressure   156/76


 


Blood Pressure   





[Right]   


 


O2 Sat by Pulse 0 L 96 





Oximetry   














  12/14/20 12/14/20





  11:45 12:00


 


Temperature  


 


Pulse Rate 107 H 88


 


Respiratory  





Rate  


 


Blood Pressure 127/87 137/77


 


Blood Pressure  





[Right]  


 


O2 Sat by Pulse  





Oximetry  














- Labs


CBC & Chem 7: 


                                 12/11/20 19:06





                                 12/11/20 19:06

## 2020-12-14 NOTE — ANESTHESIA DAY OF SURGERY
Anesthesia Day of Surgery





- Day of Surgery


Patient Examined: Yes


Patient H&P Reviewed: Yes


Patient is NPO: Yes


Beta Blockers: No


Cardiac Clearance: No


Pulmonary Clearance: No


Jose's Test: N/A

## 2020-12-15 LAB
HCT VFR BLD CALC: 27.7 % (ref 30.3–42.9)
HGB BLD-MCNC: 9.2 GM/DL (ref 10.1–14.3)

## 2020-12-15 RX ADMIN — KETOROLAC TROMETHAMINE SCH MG: 30 INJECTION, SOLUTION INTRAMUSCULAR at 05:15

## 2020-12-15 RX ADMIN — OXYCODONE AND ACETAMINOPHEN PRN TAB: 5; 325 TABLET ORAL at 20:35

## 2020-12-15 RX ADMIN — OXYCODONE AND ACETAMINOPHEN PRN TAB: 5; 325 TABLET ORAL at 04:10

## 2020-12-15 RX ADMIN — IBUPROFEN PRN MG: 800 TABLET, FILM COATED ORAL at 16:39

## 2020-12-15 RX ADMIN — KETOROLAC TROMETHAMINE SCH MG: 30 INJECTION, SOLUTION INTRAMUSCULAR at 09:52

## 2020-12-15 RX ADMIN — OXYCODONE AND ACETAMINOPHEN PRN TAB: 5; 325 TABLET ORAL at 13:15

## 2020-12-15 NOTE — PROGRESS NOTE
Assessment and Plan





- Patient Problems


(1) Status post primary low transverse  section


Current Visit: Yes   Status: Acute   


Plan to address problem: 


Continue routine PP orders


 Keep dressing clean and dry, remove on POD#2


 Anticipate d/c home in 24-48 hrs if stable


 








(2) Elevated blood pressure reading


Current Visit: Yes   Status: Acute   


Plan to address problem: 


Continue to monitor B/P


 Notify provider for SBP > 160; DBP > 100








Subjective





- Subjective


Date of service: 12/15/20


Principal diagnosis: S/P primary C/S; POD#1; anemia


Interval history: 





See admission H&P; OB operative summary and PP progress notes


Patient reports: appetite normal, voiding normally, pain well controlled (with 

medications), flatus, ambulating normally, no bowel movement


: doing well





Objective





- Vital Signs


Latest vital signs: 


                                   Vital Signs











  Temp Pulse Resp BP BP Pulse Ox


 


 12/15/20 09:52    18   


 


 12/15/20 08:32  97.9 F  87  18   141/96  97


 


 12/15/20 05:16  98.4 F  85  18   106/57  99


 


 12/15/20 04:10    18   


 


 20 23:47  98.3 F  67  20  129/70   93


 


 20 20:10  98.2 F  69  20  134/78   94


 


 20 16:13  98.3 F  61  18   141/79  99


 


 20 15:00  98.8 F  72  16  135/87   97


 


 20 14:45   69  18  144/83   97


 


 20 14:30   68  17  134/83   97


 


 20 14:15   67  18  135/88   97


 


 20 14:10   67  17  139/90   97


 


 20 14:05   69  20  139/90   97


 


 20 14:00  98.0 F  66  16  142/92   97








                                Intake and Output











 12/14/20 12/15/20 12/15/20





 23:59 07:59 15:59


 


Intake Total  240 240


 


Output Total 200 700 


 


Balance -200 -460 240


 


Intake:   


 


  Oral  240 240


 


Output:   


 


  Urine 200 700 


 


    Indwelling Catheter 200 400 


 


    Void  300 


 


Other:   


 


  Total, Intake Amount  240 240


 


  Total, Output Amount 200 300 


 


  # Voids   


 


    Void  1 














- Exam


Breasts: Present: normal


Cardiovascular: Present: Regular rate


Lungs: Present: Normal air movement


Abdomen: Present: soft, tenderness


Uterus: Present: firm, fundal height below umbilicus (U-1)


Extremities: Present: edema


Deep Tendon Reflex Grade: Normal +2


Incision: Present: dressed (no shadow drainage or bleeding noted)





- Labs


Labs: 


                              Abnormal lab results











  12/15/20 Range/Units





  08:34 


 


Hgb  9.2 L  (10.1-14.3)  gm/dl


 


Hct  27.7 L  (30.3-42.9)  %

## 2020-12-16 RX ADMIN — OXYCODONE AND ACETAMINOPHEN PRN TAB: 5; 325 TABLET ORAL at 06:00

## 2020-12-16 RX ADMIN — OXYCODONE AND ACETAMINOPHEN PRN TAB: 5; 325 TABLET ORAL at 11:44

## 2020-12-16 RX ADMIN — IBUPROFEN PRN MG: 800 TABLET, FILM COATED ORAL at 02:40

## 2020-12-16 RX ADMIN — IBUPROFEN PRN MG: 800 TABLET, FILM COATED ORAL at 09:55

## 2020-12-16 RX ADMIN — IBUPROFEN PRN MG: 800 TABLET, FILM COATED ORAL at 18:46

## 2020-12-16 NOTE — PROGRESS NOTE
Assessment and Plan


A: POD #2


     Asymptomatic Anemia


     Mild Preeclampsia





P: Follow Routine PostOrders


    Ferrous Sulfate 325mg PO BID


    BPs are trending down; Continue to Watch


    








Subjective





- Subjective


Date of service: 20


Principal diagnosis: S/P primary C/S; POD#1; anemia


Patient reports: appetite normal, voiding normally, pain well controlled, 

flatus, ambulating normally, other (Denies HAs, Visual Changes, Dizziness, and 

N&V)


: doing well (under the Jose Lights in Room with Mother), bottle feeding





Objective





- Vital Signs


Latest vital signs: 


                                   Vital Signs











  Temp Pulse Resp BP BP Pulse Ox


 


 20 11:44    18   


 


 20 09:55    18   


 


 20 08:37  97.7 F  85  18  141/83   99


 


 20 00:47  98.1 F  76  20  136/71  136/71  100


 


 12/15/20 17:07   84   127/75   95


 


 12/15/20 16:39    18   


 


 12/15/20 13:15    18   








                                Intake and Output











 12/15/20 12/16/20 12/16/20





 22:59 06:59 14:59


 


Intake Total 1560 600 240


 


Balance 1560 600 240


 


Intake:   


 


  Oral 960 600 240


 


  Intake, Free Water 600  


 


Other:   


 


  Total, Intake Amount 240 240 240


 


  # Voids   


 


    Void 1 1 1














- Exam


Breasts: Present: normal


Cardiovascular: Present: Regular rate


Lungs: Present: Clear to auscultation, Normal air movement


Abdomen: Present: normal appearance, soft, normal bowel sounds


Uterus: Present: normal, firm, fundal height below umbilicus


Extremities: Present: edema (+1 non-pitting bilateral pedal edema)


Incision: Present: normal, dry, intact

## 2020-12-17 VITALS — DIASTOLIC BLOOD PRESSURE: 87 MMHG | SYSTOLIC BLOOD PRESSURE: 149 MMHG

## 2020-12-17 RX ADMIN — OXYCODONE AND ACETAMINOPHEN PRN TAB: 5; 325 TABLET ORAL at 00:23

## 2020-12-17 RX ADMIN — OXYCODONE AND ACETAMINOPHEN PRN TAB: 5; 325 TABLET ORAL at 06:52

## 2020-12-17 RX ADMIN — IBUPROFEN PRN MG: 800 TABLET, FILM COATED ORAL at 22:49

## 2020-12-17 RX ADMIN — IBUPROFEN PRN MG: 800 TABLET, FILM COATED ORAL at 15:45

## 2020-12-17 RX ADMIN — FERROUS SULFATE TAB 325 MG (65 MG ELEMENTAL FE) SCH MG: 325 (65 FE) TAB at 10:13

## 2020-12-17 RX ADMIN — OXYCODONE AND ACETAMINOPHEN PRN TAB: 5; 325 TABLET ORAL at 20:23

## 2020-12-17 RX ADMIN — IBUPROFEN PRN MG: 800 TABLET, FILM COATED ORAL at 05:39

## 2020-12-17 RX ADMIN — FERROUS SULFATE TAB 325 MG (65 MG ELEMENTAL FE) SCH MG: 325 (65 FE) TAB at 22:48

## 2020-12-17 NOTE — PROGRESS NOTE
Assessment and Plan





POD # 3





A: s/p LTCS


    Asymptomatic Anemia


    Mild preeclampsia with Laible B/Ps 140/90- 151/84





P: Continue monitoring


    Cont Fe


    Reevaluate tomm for poss d/c home


     











Subjective





- Subjective


Date of service: 20


Principal diagnosis: S/P LTCS , anemia, mild preeclampsia


Patient reports: voiding normally, ambulating normally, other (pt denies ha, 

visual problems, or epigastric pain)


: doing well





Objective





- Vital Signs


Latest vital signs: 


                                   Vital Signs











  Temp Pulse Resp BP BP Pulse Ox


 


 20 16:27   86  18  148/77   100


 


 20 16:25  98.4 F     


 


 20 08:10  98.6 F  72  18   151/84  100


 


 20 01:10  98.4 F  84  20   128/82  98








                                Intake and Output











 20





 06:59 14:59 22:59


 


Intake Total 480 240 240


 


Balance 480 240 240


 


Intake:   


 


  Oral 480 240 240


 


Other:   


 


  Total, Intake Amount 240 240 240


 


  # Voids   


 


    Void 1  














- Exam


Breasts: Present: normal


Abdomen: Present: normal appearance, soft, normal bowel sounds


Vulva: both: normal


Uterus: Present: normal, firm, fundal height below umbilicus


Extremities: Present: normal


Incision: Present: normal, dry, intact

## 2020-12-18 NOTE — DISCHARGE SUMMARY
Providers





- Providers


Date of Admission: 


20 08:24





Date of discharge: 20


Attending physician: 


ALTHEA DIAZ





Primary care physician: 


REANNA BHANDARI JR, MD








Hospitalization


Reason for admission: induction of labor


Delivery: 


Procedure: primary low transverse


Episiotomy: none


Laceration: none


Incision: normal, dry, intact


Other postpartum procedures: none


Postpartum complications: other (PREECLAMPSIA)


Discharge diagnosis: IUP at term delivered


Butner baby: male


Hospital course: 








Pt had a primary LTCS r/t a failed IOL due to preeclampsia. PP stay was 

uncomplicated and pt was d/cd home in stable cond. See H&P,delivery summary, and

pp notes.


Condition at discharge: Stable


Disposition: DC- TO HOME OR SELFCARE





Plan





- Discharge Medications


Prescriptions: 


Ibuprofen [Motrin 800 MG tab] 800 mg PO Q6H PRN #30 tablet


 PRN Reason: Pain, Mild (1-3)


oxyCODONE /ACETAMINOPHEN [Percocet 5/325 mg] 1 tab PO Q6H PRN #30 tablet


 PRN Reason: Pain, Moderate (4-6)





- Provider Discharge Summary


Activity: no sex for 6 weeks, no heavy lifting 4 weeks (pt was d/cd on 

20), no strenuous exercise


Diet: routine


Instructions: routine


Additional instructions: 


[]  Smoking cessation referral if applicable(refer to patient education folder 

for contact #)


[]  Refer to [facility] Booklet








Call your doctor immediately for:


* Fever > 100.5


* Heavy vaginal bleeding ( >1 pad per hour)


* Severe persistent headache


* Shortness of breath


* Reddened, hot, painful area to leg or breast


* Drainage or odor from incision.





* Keep incision clean and dry at all times and follow doctor's instructions 

regarding bathing/showering











- Follow up plan


Follow up: 


REANNA BHANDARI JR, MD [Primary Care Provider] - 14 Days


Forms:  [facility] Discharge Summary

## 2022-06-23 ENCOUNTER — HOSPITAL ENCOUNTER (OUTPATIENT)
Dept: HOSPITAL 5 - TRG | Age: 22
LOS: 1 days | Discharge: HOME | End: 2022-06-24
Attending: OBSTETRICS & GYNECOLOGY
Payer: COMMERCIAL

## 2022-06-23 DIAGNOSIS — O47.03: Primary | ICD-10-CM

## 2022-06-23 DIAGNOSIS — O42.90: ICD-10-CM

## 2022-06-23 DIAGNOSIS — Z3A.32: ICD-10-CM

## 2022-06-23 DIAGNOSIS — O46.93: ICD-10-CM

## 2022-06-23 LAB
BACTERIA #/AREA URNS HPF: (no result) /HPF
MUCOUS THREADS #/AREA URNS HPF: (no result) /HPF
PH UR STRIP: 6 [PH] (ref 5–7)
RBC #/AREA URNS HPF: < 1 /HPF (ref 0–6)
UROBILINOGEN UR-MCNC: < 2 MG/DL (ref ?–2)
WBC #/AREA URNS HPF: 2 /HPF (ref 0–6)

## 2022-06-23 PROCEDURE — 82731 ASSAY OF FETAL FIBRONECTIN: CPT

## 2022-06-23 PROCEDURE — 76816 OB US FOLLOW-UP PER FETUS: CPT

## 2022-06-23 PROCEDURE — 85025 COMPLETE CBC W/AUTO DIFF WBC: CPT

## 2022-06-23 PROCEDURE — 36415 COLL VENOUS BLD VENIPUNCTURE: CPT

## 2022-06-23 PROCEDURE — 81001 URINALYSIS AUTO W/SCOPE: CPT

## 2022-06-23 PROCEDURE — 76817 TRANSVAGINAL US OBSTETRIC: CPT

## 2022-06-23 NOTE — ULTRASOUND REPORT
US OB follow up, US OB transvaginal



INDICATION / CLINICAL INFORMATION: EFW, REMEDIOS



COMPARISON: None available.



TECHNIQUE: Using a transcutaneous and endovaginal probe, multiple grayscale, color Doppler, and spect
ral Doppler images of the uterus and fetus were captured and stored.

 

FINDINGS:



Single fetus in transverse head to the maternal right position is present with heart rate 166 bpm.



Amniotic fluid index is within normal limits measuring 17.9 cm.



Grade 1 posterior placenta is present.



Incidental note made that uterine contractions anterior wall were identified during image acquisition
.



Maternal cervix is measured at 4 cm. Minimal funneling of the internal os is present. Additional mini
mal debris and fluid are present at the os.



Biparietal Diameter = 7.63 cm = 30, 4 weeks, days

Head Circumference = 29.55 cm = 32, 5 weeks, days

Abdominal Circumference = 27.77 cm = 31, 6 weeks, days

Femur Length = 6.19 cm = 32, 1 weeks, days

Average Ultrasound Age (AUA) = 31, 6 weeks, days. EDC 8/19/2022.



Estimated fetal weight = 1858 g +/- 2 175 g; growth percentile 24%..



IMPRESSION:

1. Minimal funneling of the internal cervical os is present. Complex echogenic debris is present. The
 endocervical canal demonstrates no foreshortening.

2. Single living intrauterine gestation as detailed.



Signer Name: Jasson Hernandes II, MD 

Signed: 6/23/2022 10:36 PM

Workstation Name: [id number]

## 2022-06-23 NOTE — ULTRASOUND REPORT
US OB follow up, US OB transvaginal



INDICATION / CLINICAL INFORMATION: EFW, REMEDIOS



COMPARISON: None available.



TECHNIQUE: Using a transcutaneous and endovaginal probe, multiple grayscale, color Doppler, and spect
ral Doppler images of the uterus and fetus were captured and stored.

 

FINDINGS:



Single fetus in transverse head to the maternal right position is present with heart rate 166 bpm.



Amniotic fluid index is within normal limits measuring 17.9 cm.



Grade 1 posterior placenta is present.



Incidental note made that uterine contractions anterior wall were identified during image acquisition
.



Maternal cervix is measured at 4 cm. Minimal funneling of the internal os is present. Additional mini
mal debris and fluid are present at the os.



Biparietal Diameter = 7.63 cm = 30, 4 weeks, days

Head Circumference = 29.55 cm = 32, 5 weeks, days

Abdominal Circumference = 27.77 cm = 31, 6 weeks, days

Femur Length = 6.19 cm = 32, 1 weeks, days

Average Ultrasound Age (AUA) = 31, 6 weeks, days. EDC 8/19/2022.



Estimated fetal weight = 1858 g +/- 2 175 g; growth percentile 24%..



IMPRESSION:

1. Minimal funneling of the internal cervical os is present. Complex echogenic debris is present. The
 endocervical canal demonstrates no foreshortening.

2. Single living intrauterine gestation as detailed.



Signer Name: Jasson Hernandes II, MD 

Signed: 6/23/2022 10:36 PM

Workstation Name: [id number]

## 2022-06-24 LAB
BASOPHILS # (AUTO): 0 K/MM3 (ref 0–0.1)
BASOPHILS NFR BLD AUTO: 0.3 % (ref 0–1.8)
EOSINOPHIL # BLD AUTO: 0.2 K/MM3 (ref 0–0.4)
EOSINOPHIL NFR BLD AUTO: 1.3 % (ref 0–4.3)
HCT VFR BLD CALC: 35.3 % (ref 30.3–42.9)
HGB BLD-MCNC: 11.8 GM/DL (ref 10.1–14.3)
LYMPHOCYTES # BLD AUTO: 2.4 K/MM3 (ref 1.2–5.4)
LYMPHOCYTES NFR BLD AUTO: 19.1 % (ref 13.4–35)
MCHC RBC AUTO-ENTMCNC: 34 % (ref 30–34)
MCV RBC AUTO: 92 FL (ref 79–97)
MONOCYTES # (AUTO): 1.1 K/MM3 (ref 0–0.8)
MONOCYTES % (AUTO): 8.4 % (ref 0–7.3)
PLATELET # BLD: 390 K/MM3 (ref 140–440)
RBC # BLD AUTO: 3.82 M/MM3 (ref 3.65–5.03)

## 2022-08-18 ENCOUNTER — HOSPITAL ENCOUNTER (INPATIENT)
Dept: HOSPITAL 5 - APU | Age: 22
LOS: 2 days | Discharge: HOME | End: 2022-08-20
Attending: OBSTETRICS & GYNECOLOGY | Admitting: OBSTETRICS & GYNECOLOGY
Payer: COMMERCIAL

## 2022-08-18 DIAGNOSIS — O34.211: Primary | ICD-10-CM

## 2022-08-18 DIAGNOSIS — Z3A.39: ICD-10-CM

## 2022-08-18 DIAGNOSIS — K21.9: ICD-10-CM

## 2022-08-18 LAB
BASOPHILS # (AUTO): 0.1 K/MM3 (ref 0–0.1)
BASOPHILS NFR BLD AUTO: 0.5 % (ref 0–1.8)
EOSINOPHIL # BLD AUTO: 0.1 K/MM3 (ref 0–0.4)
EOSINOPHIL NFR BLD AUTO: 1.4 % (ref 0–4.3)
HCT VFR BLD CALC: 36.5 % (ref 30.3–42.9)
HGB BLD-MCNC: 12.7 GM/DL (ref 10.1–14.3)
LYMPHOCYTES # BLD AUTO: 2.7 K/MM3 (ref 1.2–5.4)
LYMPHOCYTES NFR BLD AUTO: 26.4 % (ref 13.4–35)
MCHC RBC AUTO-ENTMCNC: 35 % (ref 30–34)
MCV RBC AUTO: 89 FL (ref 79–97)
MONOCYTES # (AUTO): 1.1 K/MM3 (ref 0–0.8)
MONOCYTES % (AUTO): 10.7 % (ref 0–7.3)
PLATELET # BLD: 411 K/MM3 (ref 140–440)
RBC # BLD AUTO: 4.08 M/MM3 (ref 3.65–5.03)

## 2022-08-18 PROCEDURE — 96361 HYDRATE IV INFUSION ADD-ON: CPT

## 2022-08-18 PROCEDURE — 86901 BLOOD TYPING SEROLOGIC RH(D): CPT

## 2022-08-18 PROCEDURE — 85018 HEMOGLOBIN: CPT

## 2022-08-18 PROCEDURE — 96374 THER/PROPH/DIAG INJ IV PUSH: CPT

## 2022-08-18 PROCEDURE — U0003 INFECTIOUS AGENT DETECTION BY NUCLEIC ACID (DNA OR RNA); SEVERE ACUTE RESPIRATORY SYNDROME CORONAVIRUS 2 (SARS-COV-2) (CORONAVIRUS DISEASE [COVID-19]), AMPLIFIED PROBE TECHNIQUE, MAKING USE OF HIGH THROUGHPUT TECHNOLOGIES AS DESCRIBED BY CMS-2020-01-R: HCPCS

## 2022-08-18 PROCEDURE — 96360 HYDRATION IV INFUSION INIT: CPT

## 2022-08-18 PROCEDURE — 85014 HEMATOCRIT: CPT

## 2022-08-18 PROCEDURE — 96376 TX/PRO/DX INJ SAME DRUG ADON: CPT

## 2022-08-18 PROCEDURE — 3E0T3BZ INTRODUCTION OF ANESTHETIC AGENT INTO PERIPHERAL NERVES AND PLEXI, PERCUTANEOUS APPROACH: ICD-10-PCS | Performed by: OBSTETRICS & GYNECOLOGY

## 2022-08-18 PROCEDURE — 36415 COLL VENOUS BLD VENIPUNCTURE: CPT

## 2022-08-18 PROCEDURE — 86592 SYPHILIS TEST NON-TREP QUAL: CPT

## 2022-08-18 PROCEDURE — 86850 RBC ANTIBODY SCREEN: CPT

## 2022-08-18 PROCEDURE — 85025 COMPLETE CBC W/AUTO DIFF WBC: CPT

## 2022-08-18 PROCEDURE — 86900 BLOOD TYPING SEROLOGIC ABO: CPT

## 2022-08-18 RX ADMIN — MORPHINE SULFATE PRN MG: 4 INJECTION, SOLUTION INTRAMUSCULAR; INTRAVENOUS at 18:15

## 2022-08-18 NOTE — PROGRESS NOTE
Spinal Anesthesia Block





- Spinal Anesthesia Block


Start Time: 11:40


Stop Time: 11:46


Performed by:: MERLYN FERNANDEZ


Procedure: 





The patient was placed in a sitting position on the OR table and monitors 

applied.  A timeout was performed immediately prior to the start of the 

procedure. The patient was Prepped and draped in a sterile fashion and the skin 

was localized with 3 mL 1% lidocaine at L[4]-L[5] interspace. An introducer was 

placed into the back between L4-L5 and a 25g spinal needle was advanced into the

 intrathecal space until clear, free flowing CSF was observed. 1.8cc of 0.75% 

hyperbaric bupivacaine + 0.5mcg Precedex was injected into the intrathecal space

 and the spinal needle was removed. The patient tolerated the procedure well and

 there were no immediate complications noted.

## 2022-08-18 NOTE — ANESTHESIA CONSULTATION
Anesthesia Consult and Med Hx


Date of service: 08/18/22





- Airway


Anesthetic Teeth Evaluation: Good


ROM Head & Neck: Adequate


Mental/Hyoid Distance: Adequate


Mallampati Class: Class II


Intubation Access Assessment: Probably Good





- Pulmonary Exam


CTA: Yes





- Cardiac Exam


Cardiac Exam: RRR





- Pre-Operative Health Status


ASA Pre-Surgery Classification: ASA2


Proposed Anesthetic Plan: Spinal


Nerve Block: Teodoro Tap





- Pulmonary


Hx Smoking: No


Hx Asthma: No


Hx Respiratory Symptoms: No


SOB: No


COPD: No


Hx Pneumonia: No


Hx Sleep Apnea: No





- Cardiovascular System


Hx Hypertension: Yes (Pregnancy)


Hx Coronary Artery Disease: No


Hx Heart Attack/AMI: No


Hx Angina: No


Hx Percutaneous Transluminal Coronary Angioplasty (PTCA): No


Hx Cardia Arrhythmia: No


Hx Pacemaker: No


Hx Internal Defibrillator: No


Hx Valvular Heart Disease: No


Hx Heart Murmur: No


Hx Peripheral Vascular Disease: No





- Central Nervous System


Hx Neuromuscular Disorder: No


Hx Seizures: No


CVA: No


Hx Back Pain: No


Hx Psychiatric Problems: No





- Gastrointestinal


Hx Ulcer: No


Hx Gastroesophageal Reflux Disease: Yes





- Endocrine


Hx Renal Disease: No


Hx End Stage Renal Disease: No


Hx Cirrhosis: No


Hx Liver Disease: No


Hx Insulin Dependent Diabetes: No


Hx Non-Insulin Dependent Diabetes: No


Hx Thyroid Disease: No


Hx Hypothyroidism: No


Hx Hyperthyroidism: No





- Hematic


Hx Anemia: No


Hx Sickle Cell Disease: No





- Other Systems


Hx Alcohol Use: No


Hx Substance Use: No


Hx Cancer: No


Hx Obesity: Yes

## 2022-08-18 NOTE — PROGRESS NOTE
Regional Anesthesia Block





- Regional Anesthesia Block


Start Time: 13:40


Stop Time: 13:45


Performed By:: MERLYN FERNANDEZ


Procedure: 





During the pre-op interview the patient agreed to and signed a consent for a TAP

block for post surgical pain management.  After her C/S was completed a time out

was performed prior to the start of the procedure.  The Trans Abdominal Plane 

was identified bilaterally via ultrasound.  The skin was prepped bilaterally 

with chlorhexidine and a 22g stimuplex needle was advanced to the area between 

the internal oblique muscle and the trans abdominal plane.  Marcaine 0.25% 

30mlwas  injected under ultrasound guidance on the left and right side. Negative

aspiration every 5mL, There was no change in the patients heart rate or rhythm 

and the patient tolerated the procedure well. No apparent complications were 

observed.

## 2022-08-18 NOTE — HISTORY AND PHYSICAL REPORT
History of Present Illness


Date of examination: 22


Date of admission: 


22 09:01





Chief complaint: 





Desires repeat Csection


History of present illness: 





21 yo  EDC 22 @ 39 5/7 weeks for repeat Csection.





Past History


Past Medical History: no pertinent history


Past Surgical History:  section


Family/Genetic History: none


Social history: no significant social history





- Obstetrical History


: 2


Para: 1


Hx # Term Pregnancies: 1


Number of  Pregnancies: 0


Spontaneous Abortions: 0


Induced : 0


Number of Living Children: 1


  ** #1


Infant Gender: Male


Birth year: [birth year]


Birthweight: 2.58 kg


Method of Delivery:  (Nonreassuring Fetal heart rate.)


Gestational age at delivery: 37


Complications: other (Preeclampsia)





Medications and Allergies


                                    Allergies











Allergy/AdvReac Type Severity Reaction Status Date / Time


 


No Known Allergies Allergy   Verified 22 17:14











                                Home Medications











 Medication  Instructions  Recorded  Confirmed  Last Taken  Type


 


Aspirin [Adult Aspirin] 81 mg PO DAILY 22 Unknown History











Active Meds: 


Active Medications





Lactated Ringer's (Lactated Ringers)  1,000 mls @ 2,250 mls/hr IV PREOP SALVADOR


   Stop: 22 11:12


   Last Admin: 22 10:45 Dose:  2,250 mls/hr


   


Oxytocin/Sodium Chloride (Pitocin/Ns 30 Unit/500ml)  30 units in 500 mls @ 0 

mls/hr IV TITR [facility]OR


Cefazolin Sodium (Ancef/Sterile Water 2 Gm/20 Ml)  2 gm in 20 mls @ 80 mls/hr IV

PREOP NR; Protocol


   Stop: 22 10:59











- Vital Signs


Vital signs: 


                                   Vital Signs











Temp Pulse Resp BP Pulse Ox


 


 98.5 F   81   18   105/64   99 


 


 22 09:43  22 09:43  22 09:43  22 09:43  22 09:43








                                        











Temp Pulse Resp BP Pulse Ox


 


 98.5 F   87   18   105/64   99 


 


 22 09:43  22 11:23  22 09:43  22 09:46  22 11:23














- Physical Exam


Uterus: Positive: enlarged





- Obstetrical


FHR: category 1


Uterine Contraction Monitor Mode: External


Uterine Contraction Pattern: Irregular





Results


Result Diagrams: 


                                 08/15/22 10:31





                              Abnormal lab results











  08/15/22 Range/Units





  10:31 


 


MCHC  35 H  (30-34)  %


 


RDW  13.1 L  (13.2-15.2)  %


 


Mono % (Auto)  10.7 H  (0.0-7.3)  %


 


Mono # (Auto)  1.1 H  (0.0-0.8)  K/mm3








All other labs normal.








Assessment and Plan





IUP @ 39 5/7 weeks


Previous Csection


Late Prenatal Care


Closely Spaced Pregnancy





Plan


Repeat Csection


Csection consent signed

## 2022-08-19 LAB
HCT VFR BLD CALC: 29.5 % (ref 30.3–42.9)
HGB BLD-MCNC: 9.8 GM/DL (ref 10.1–14.3)

## 2022-08-19 RX ADMIN — HYDROCODONE BITARTRATE AND ACETAMINOPHEN PRN EACH: 5; 325 TABLET ORAL at 03:39

## 2022-08-19 RX ADMIN — HYDROCODONE BITARTRATE AND ACETAMINOPHEN PRN EACH: 5; 325 TABLET ORAL at 15:24

## 2022-08-19 RX ADMIN — HYDROCODONE BITARTRATE AND ACETAMINOPHEN PRN EACH: 5; 325 TABLET ORAL at 09:00

## 2022-08-19 RX ADMIN — MORPHINE SULFATE PRN MG: 4 INJECTION, SOLUTION INTRAMUSCULAR; INTRAVENOUS at 00:26

## 2022-08-19 RX ADMIN — IBUPROFEN PRN MG: 600 TABLET, FILM COATED ORAL at 13:51

## 2022-08-19 RX ADMIN — IBUPROFEN PRN MG: 600 TABLET, FILM COATED ORAL at 22:33

## 2022-08-19 NOTE — PROGRESS NOTE
Assessment and Plan





A: POD # 1 - stable


P: Continue post -op care 





Subjective





- Subjective


Date of service: 22


Principal diagnosis: Repeat C-sect - POD # 1


Patient reports: appetite normal


: doing well





Objective





- Vital Signs


Latest vital signs: 


                                   Vital Signs











  Temp Pulse Resp BP BP Pulse Ox Pulse Ox


 


 22 09:49   83   117/61   98 


 


 22 08:28  97.9 F  88  20  159/87   100 


 


 22 07:55        99


 


 22 04:30  98.5 F  88  18   103/78  


 


 22 00:56    18    


 


 22 00:33  98.0 F  71  18  125/74   99 


 


 22 00:26    18    


 


 22 21:00        97


 


 22 20:03  98.6 F  63  18  134/81   97 


 


 22 15:59        98


 


 22 15:40  97.9 F  63  18   125/63  100 


 


 22 14:30  97.9 F  57 L  18  118/76   100 


 


 22 14:15   54 L  18  122/75   100 


 


 22 14:00   54 L  18  118/58   100 








                                Intake and Output











 22





 22:59 06:59 14:59


 


Intake Total  200 


 


Output Total 1900 1200 


 


Balance -1900 -1000 


 


Intake:   


 


  Oral  200 


 


Output:   


 


  Urine 1900 1200 


 


    Indwelling Catheter 1900 500 


 


    Void  700 


 


Other:   


 


  Total, Intake Amount  200 


 


  Total, Output Amount 1800 600 


 


  # Voids   


 


    Void  1 














- Exam


Breasts: Present: deferred


Cardiovascular: Present: Regular rate


Vulva: both: normal


Uterus: Present: fundal height below umbilicus


Extremities: Present: normal


Deep Tendon Reflex Grade: Normal +2


Incision: Present: dressed





- Labs


Labs: 


                              Abnormal lab results











  22 Range/Units





  03:52 


 


Hgb  9.8 L  (10.1-14.3)  gm/dl


 


Hct  29.5 L  (30.3-42.9)  %

## 2022-08-20 VITALS — SYSTOLIC BLOOD PRESSURE: 136 MMHG | DIASTOLIC BLOOD PRESSURE: 81 MMHG

## 2022-08-20 RX ADMIN — HYDROCODONE BITARTRATE AND ACETAMINOPHEN PRN EACH: 5; 325 TABLET ORAL at 00:34

## 2022-08-20 RX ADMIN — HYDROCODONE BITARTRATE AND ACETAMINOPHEN PRN EACH: 5; 325 TABLET ORAL at 13:33

## 2022-08-20 NOTE — PROGRESS NOTE
Assessment and Plan


A: POD # 2 - stable


P: Discharge home today


    Discharge instructions given











Subjective





- Subjective


Date of service: 22


Principal diagnosis: Repeat C-sect - POD # 2


Interval history: 





Feels good, wants to go home


Patient reports: appetite normal, flatus


: doing well





Objective





- Vital Signs


Latest vital signs: 


                                   Vital Signs











  Temp Pulse Resp BP Pulse Ox Pulse Ox


 


 22 08:16  98.2 F  77  18  127/74  100 


 


 22 07:55       98


 


 22 02:49       98


 


 22 23:54  98.4 F  74  20  125/69  97 


 


 22 22:33    18   


 


 22 17:33  98.3 F  75  18  138/83  97 


 


 22 11:58  97.5 F L  70  18  114/71  99 








                                Intake and Output











 22





 22:59 06:59 14:59


 


Intake Total   120


 


Balance   120


 


Intake:   


 


  Oral   120


 


Other:   


 


  Total, Intake Amount   120


 


  # Voids   


 


    Void 3  1














- Exam


Breasts: Present: deferred


Cardiovascular: Present: Regular rate


Lungs: Present: Clear to auscultation


Abdomen: Present: soft


Vulva: both: normal


Uterus: Present: fundal height below umbilicus


Deep Tendon Reflex Grade: Normal +2

## 2022-08-20 NOTE — DISCHARGE SUMMARY
Providers





- Providers


Date of Admission: 


22 09:01





Date of discharge: 22


Attending physician: 


JOHANN GOTTI MD





Primary care physician: 


JOHANN GOTTI MD








Hospitalization


Reason for admission:  section


Delivery: 


Procedure: repeat low transverse


Incision: intact


Postpartum complications: none


Discharge diagnosis: IUP at term delivered


Blanding baby: female


Condition at discharge: Good


Disposition: 01 HOME / SELF CARE / HOMELESS





Plan





- Provider Discharge Summary


Activity: routine, no sex for 6 weeks, no strenuous exercise


Diet: routine


Instructions: routine


Additional instructions: 


[]  Smoking cessation referral if applicable(refer to patient education folder 

for contact #)


[]  Refer to [facility] Booklet








Call your doctor immediately for:


* Fever > 100.5


* Heavy vaginal bleeding ( >1 pad per hour)


* Severe persistent headache


* Shortness of breath


* Reddened, hot, painful area to leg or breast


* Drainage or odor from incision.





* Keep incision clean and dry at all times and follow doctor's instructions 

regarding bathing/showering











- Follow up plan


Follow up: 


JOHANN GOTTI MD [Primary Care Provider] - 14 Days